# Patient Record
Sex: MALE | Race: BLACK OR AFRICAN AMERICAN | URBAN - METROPOLITAN AREA
[De-identification: names, ages, dates, MRNs, and addresses within clinical notes are randomized per-mention and may not be internally consistent; named-entity substitution may affect disease eponyms.]

---

## 2019-11-11 ENCOUNTER — HOSPITAL ENCOUNTER (OUTPATIENT)
Dept: PHYSICAL THERAPY | Age: 39
Discharge: HOME | End: 2019-11-11
Payer: COMMERCIAL

## 2019-11-11 PROCEDURE — 97162 PT EVAL MOD COMPLEX 30 MIN: CPT

## 2019-11-11 PROCEDURE — 97140 MANUAL THERAPY 1/> REGIONS: CPT

## 2019-11-11 NOTE — PROGRESS NOTES
[address] [facility] PHYSICAL THERAPY  [address] [facility] [address], [address] - Phone: [phone]  Fax: [phone] / [address] [facility]  Patient Name: Airam Gann : 1980   Medical   Diagnosis: Left shoulder pain [M25.512] Treatment Diagnosis: Left arm pain   Onset Date: 2019     Referral Source: Trenton Alas MD [facility]): 2019   Prior Hospitalization: See medical history Provider #: [id number]   Prior Level of Function: Independent   Comorbidities: Unremarkable   Medications: Verified on Patient Summary List   The Plan of Care and following information is based on the information from the initial evaluation.   ===========================================================================================  Assessment / key information:  Pt is a [place]year old male who presents to PT today with c/o left shoulder pain after sleeping on arm in 2019. Upon evaluation, signs and symptoms consistent with left RTC pathology. Pt has continued to perform his UE lifting routine without modifications despite reporting 7/10 left arm pain. Pt presents with abnormal posture, decreased left shoulder AROm, decreased shoulder girdle strength and decreased left t-spine mobility. PT noted decreased left parascapular musculature specifically middle trap and lower trap compared to contralateral shoulder girdle. Mild hiking noted with active elevation and decreased ER AROM and strength in 90/90 position. Pt would benefit from skilled PT to address functional deficits listed above and facilitate return to PLOF. Physical Therapy Evaluation - Shoulder     Posture:  Forward held head, rounded shoulders, mild kythphotic posture     Thoracic Spine: Limited rotation noted to the left compared to the right     C/S screen: WNL in all planes     Palpation: Pt TTP along posterior cuff region, specifically posterior corner and distal RTC region.      Scapulohumoral Control / Rhythm:  Static Position: WNL  Scapular position with weighted elevation: WNL  Able to eccentrically lower with good control?  Left: [x] Yes   [] No     Right: [x] Yes   [] No     ROM:                                             AROM/PROM    Left Right   Flexion WNL WNL   Extension WNL WNL   Scaption/ABD WNL WNL   ER @ 0 Degrees WNL WNL   ER @ 90 Degrees 67/80 90/WNL   IR @ HBB T10 T7   - Mild hiking noted with abduction     Strength:                                                                              L (1-5) R (1-5) Pain   Flexors 4/5 4+/5 [] Yes   [] No   Abductors 4/5 4+/5 [x] Yes   [] No   External Rotators 4/5 4+/5 [x] Yes   [] No   Internal Rotators 4/5 4+/5 [x] Yes   [] No   Extension 4+/5 4+/5 [] Yes   [] No   Serratus Anterior 4/5 4+/5 [] Yes   [] No   Subscap 4/5 4+/5 [x] Yes   [] No   Middle Trapezius 4/5 4+/5 [] Yes   [] No   Lower Trapezius 4-/5 4+/5 [] Yes   [] No      Accessory Motions: PT noted WNL accessory motion with normal laxity noted bilaterally     Special tests:    Neer's Test                  [] Pos   [x] Neg        Hawkin's Test              [x] Pos   [] Neg        Speed's Test               [] Pos   [x] Neg        Empty Can                  [x] Pos   [] Neg        Anterior Apprehension            [x] Pos   [] Neg        Posterior Apprehension          [x] Pos   [] Neg    ===========================================================================================  Eval Complexity: History: MEDIUM  Complexity : 1-2 comorbidities / personal factors will impact the outcome/ POC Exam:MEDIUM Complexity : 3 Standardized tests and measures addressing body structure, function, activity limitation and / or participation in recreation  Presentation: MEDIUM Complexity : Evolving with changing characteristics  Clinical Decision Making:MEDIUM Complexity : FOTO score of 26-74Overall Complexity:MEDIUM    FOTO score:    Problem List: pain affecting function, decrease ROM, decrease strength, edema affecting function, impaired gait/ balance, decrease ADL/ functional abilitiies, decrease activity tolerance, decrease flexibility/ joint mobility and decrease transfer abilities   Treatment Plan may include any combination of the following: Therapeutic exercise, Therapeutic activities, Neuromuscular re-education, Physical agent/modality, Gait/balance training, Manual therapy, Aquatic therapy, Patient education, Self Care training, Functional mobility training, Home safety training and Stair training, DRY needling  Patient / Family readiness to learn indicated by: asking questions, trying to perform skills and interest  Persons(s) to be included in education: patient (P)  Barriers to Learning/Limitations: None  Measures taken: NA   Patient Goal (s): Decrease pain   Patient self reported health status: excellent  Rehabilitation Potential: excellent   Short Term Goals: To be accomplished in  2  weeks:  1. Pt will be compliant with HEP for symptom management at home. 2. Pt will increase left shoulder 90/90 ER AROM to 90 deg pain free in order to complete all OH self hygiene tasks.  Long Term Goals: To be accomplished in  4  weeks:  1. Pt will be independent with HEP at D/C for self management. 2. Pt will complete UE recreational activity routine with no c/o left UE pain in order to return to prior activity level. 3. Pt will increase left lower trap strength to at least 4+/5 in order to improve scapular mechanics with OH elevation.    Frequency / Duration:   Patient to be seen  2  times per week for 4-6  weeks:  Patient / Caregiver education and instruction: self care, activity modification and exercises    Therapist Signature: Brandi Boucher PT Date: 53/00/5216   Certification Period: NA Time: 10:27 AM   ===========================================================================================  I certify that the above Physical Therapy Services are being furnished while the patient is under my care. I agree with the treatment plan and certify that this therapy is necessary. Physician Signature:        Date:       Time:     Please sign and return to [employer] or you may fax the signed copy to [phone]. Thank you.

## 2019-11-11 NOTE — PROGRESS NOTES
PHYSICAL THERAPY - DAILY TREATMENT NOTE    Patient Name: Janet Carrera        Date: 2019  : 1980   YES Patient  Verified  Visit #:   1     Insurance: Payor: Keisha Ortiz / Plan: Cristal Labrum / Product Type: PPO /      In time: 3:50 Out time: 4:40   Total Treatment Time: 50     Medicare Time Tracking (below)   Total Timed Codes (min):  NA 1:1 Treatment Time:  NA     TREATMENT AREA = Left shoulder pain [M25.512]    SUBJECTIVE    Pain Level (on 0 to 10 scale):  0  / 10   Medication Changes/New allergies or changes in medical history, any new surgeries or procedures? NO    If yes, update Summary List   Subjective Functional Status/Changes:  []  No changes reported   CC:Left shoulder pain    ADI/HPI: Pt is a RHD male who  states in late July he was sleeping on stomach with hands above head and woke with th \"worst shoulder pain. \" Pt reports the pain was constant specifically pain/clicking when turning left. Clicking is not present all the time. Pt reports his shoulder pain is getting better but he is losing mobility. Pt states he was lifting today with back squats and was unable to get left UE in position to hold bar. Pt denies any subluxations or dislocations. Since July, pt has laid on back with 10# in 90/90 position to work on ROM. Pt has continued his normal routine which is side raises, [employer] and bench press, and pec flys. Pt reports continuing his routine with 6-7/10 pain. Pt denies any numbness/tingling down UE's. Pt denies any changes in his workout routine or ADI to left shoulder region. Symptoms  Pain rating (0-10): Today:0/10   Best:0/10   Worst:7/10     Aggravating factors:ER in various positions of abduction.      Relieving factors:resting    Pain with functional activities:  [] Yes [x] No Reaching OH  [x] Yes [] No Tucking in shirt  [x] Yes [] No Lifting milk carton  [x] Yes [] No Pushing/pulling  [x] Yes [] No Reaching behind  [] Yes [x] No Checking mirrors  [] Yes [x] No Headaches    Occupational tasks: [employer]    Diagnostic Tests: None yet       OBJECTIVE  Physical Therapy Evaluation - Shoulder    Posture: Forward held head, rounded shoulders, mild kythphotic posture    Thoracic Spine: Limited rotation noted to the left compared to the right    C/S screen: WNL in all planes    Palpation: Pt TTP along posterior cuff region, specifically posterior corner and distal RTC region. Scapulohumoral Control / Rhythm:  Static Position: WNL  Scapular position with weighted elevation: WNL  Able to eccentrically lower with good control?  Left: [x] Yes   [] No     Right: [x] Yes   [] No    ROM:                                             AROM/PROM   Left Right   Flexion WNL WNL   Extension WNL WNL   Scaption/ABD WNL WNL   ER @ 0 Degrees WNL WNL   ER @ 90 Degrees 67/80 90/90   IR @ HBB T10 T7   - Mild hiking noted with abduction    Strength:                                                                             L (1-5) R (1-5) Pain   Flexors 4/5 4+/5 [] Yes   [] No   Abductors 4/5 4+/5 [x] Yes   [] No   External Rotators 4/5 4+/5 [x] Yes   [] No   Internal Rotators 4/5 4+/5 [x] Yes   [] No   Extension 4+/5 4+/5 [] Yes   [] No   Serratus Anterior 4/5 4+/5 [] Yes   [] No   Subscap 4/5 4+/5 [x] Yes   [] No   Middle Trapezius 4/5 4+/5 [] Yes   [] No   Lower Trapezius 4-/5 4+/5 [] Yes   [] No     Accessory Motions: PT noted WNL accessory motion with normal laxity noted bilaterally    Special tests:    Neer's Test  [] Pos   [x] Neg   Hawkin's Test  [x] Pos   [] Neg   Speed's Test  [] Pos   [x] Neg   Empty Can  [x] Pos   [] Neg   Anterior Apprehension [x] Pos   [] Neg   Posterior Apprehension [x] Pos   [] Neg      Other Tests / Comments:     Therapeutic Procedures:  Min Procedure Specifics + Rationale   n/a [x]  Patient Education (performed throughout session) [x] Review HEP    [] Progressed/Changed HEP based on:   [] proper performance and advancement of Therex/TA   [] reduction in pain level    [] increased functional capacity       [] change in directional preference   2 [x] Therapeutic Exercise   [x]  See Flowsheet   Rationale: increase ROM and increase strength to improve the patients ability to participate in ADL's    10 [x]  Manual Therapy         FRAM (F1/F2) and medium dynamic cupping along parascapular region. Rationale: decrease pain, increase ROM, increase tissue extensibility, decrease trigger points and increase postural awareness to attain functional use and participation with ADL's     Other Objective/Functional Measures:    See objective measurements above. Pt demonstrated and verbalized independence with his HEP. Post Treatment Pain Level (on 0 to 10) scale:   0  / 10     ASSESSMENT    Assessment/Changes in Function:See POC     Justification for Eval Code Complexity: Moderate  Patient History (low 0, mod 1-2, high 3-4): Moderate; left shoulder pain present since July 2019   Examination (low 1-2, mod 3+, high 4+): Moderate: decreased UE strength, decreased t-spine mobility, decreased shoulder AROM   Clinical Presentation (low: stable/uncomplicated; mod: evolving; high: unstable/unpredictable): Moderate  Clinical Decision Making (low , mod 26-74, high 1-25): Moderate: based on clinical evaluation.      [x]  See Plan of Care  []  See Progress Note/ Recertification  []  See Discharge Summary        Patient will continue to benefit from skilled PT services to modify and progress therapeutic interventions, address functional mobility deficits, address ROM deficits, address strength deficits, analyze and address soft tissue restrictions, analyze and cue movement patterns, analyze and modify body mechanics/ergonomics, assess and modify postural abnormalities, address imbalance/dizziness and instruct in home and community integration  to attain remaining goals   Progress toward goals / Updated goals:    See POC     PLAN    [x]  Upgrade activities as tolerated  [x]  Update interventions per flow sheet YES Continue plan of care   []  Discharge due to :    []  Other:      Therapist: Danisha Coleman DPT    Date: 11/11/2019 Time: 10:26 AM     Future Appointments   Date Time Provider Kamar Virgen   11/11/2019  4:00 PM Jay Sellers PT [facility]

## 2019-11-18 ENCOUNTER — HOSPITAL ENCOUNTER (OUTPATIENT)
Dept: PHYSICAL THERAPY | Age: 39
Discharge: HOME | End: 2019-11-18
Payer: COMMERCIAL

## 2019-11-18 PROCEDURE — 97110 THERAPEUTIC EXERCISES: CPT

## 2019-11-18 NOTE — PROGRESS NOTES
PHYSICAL THERAPY - DAILY TREATMENT NOTE    Patient Name: Alda Gibbs        Date: 2019  : 1980   YES Patient  Verified  Visit #:  2     Insurance: Payor: Roselia Sorenson / Plan: Daylin Ulrich / Product Type: PPO /      In time:  Out time: 830   Total Treatment Time: 35     Medicare/BCBS [place] Time Tracking (below)   Total Timed Codes (min):  35 1:1 Treatment Time:  35     TREATMENT AREA = Left shoulder pain [M25.512]  SUBJECTIVE    Pain Level (on 0 to 10 scale):  0  / 10   Medication Changes/New allergies or changes in medical history, any new surgeries or procedures?     NO    If yes, update Summary List   Subjective Functional Status/Changes:  []  No changes reported     Reports held shoulder workout at gym since last session  Denies pain at rest; pain with endrange extension and external rotatio         OBJECTIVE  Therapeutic Procedures:  Min Procedure Specifics + Rationale   n/a [x]  Patient Education (performed throughout session) [x] Review HEP    [] Progressed/Changed HEP based on:   [] proper performance and advancement of Therex/TA   [] reduction in pain level    [] increased functional capacity       [] change in directional preference   35(35) [x] Therapeutic Exercise   [x]  See Flowsheet   Rationale: increase ROM and increase strength to improve the patients ability to participate in ADL's      Other Objective/Functional Measures:    Initiated exercises for scapular strengthening and flexibility of anterior chest wall and shoulder  Reviewed HEP; added prone strengthening exercises and open book stretch  Trialed doorway stretch; demonstrates decreased shoulder extension at 90 degrees abduction and reports pain; modified with elevated shoulder height      Post Treatment Pain Level (on 0 to 10) scale:  0 / 10     ASSESSMENT    Assessment/Changes in Function:     Increased left shoulder scapular strength and mechanics needed to aid in recreational gym program     []  See Progress Note/Recertification   Patient will continue to benefit from skilled PT services to analyze, cue, progress, modify, demonstrate, instruct, and address, movement patterns, therapeutic interventions, postural abnormalities, soft tissue restrictions, ROM, strength, functional mobility, body mechanics/ergonomics, and home and community integration to attain remaining goals. Progress toward goals / Updated goals:  Progressing; initiated exercises per flowsheet to address goals  · Short Term Goals: To be accomplished in  2  weeks:  1. Pt will be compliant with HEP for symptom management at home. 2. Pt will increase left shoulder 90/90 ER AROM to 90 deg pain free in order to complete all OH self hygiene tasks. · Long Term Goals: To be accomplished in  4  weeks:  1. Pt will be independent with HEP at D/C for self management. 2. Pt will complete UE recreational activity routine with no c/o left UE pain in order to return to prior activity level. 3. Pt will increase left lower trap strength to at least 4+/5 in order to improve scapular mechanics with OH elevation.       PLAN    [x]  Upgrade activities as tolerated YES Continue plan of care   []  Discharge due to :    []  Other:      Therapist: Ned Sarabia PT, DPT    Date: 11/18/2019 Time: 8:44 AM     Future Appointments   Date Time Provider Kamar Virgen   11/25/2019  8:00 AM Travis Hickman PT [facility]

## 2019-11-25 ENCOUNTER — HOSPITAL ENCOUNTER (OUTPATIENT)
Dept: PHYSICAL THERAPY | Age: 39
Discharge: HOME | End: 2019-11-25
Payer: COMMERCIAL

## 2019-11-25 PROCEDURE — 97110 THERAPEUTIC EXERCISES: CPT

## 2019-11-25 NOTE — PROGRESS NOTES
PHYSICAL THERAPY - DAILY TREATMENT NOTE    Patient Name: Forest Betts        Date: 2019  : 1980   YES Patient  Verified  Visit #:   3     Insurance: Payor: Lei Steen / Plan: Iqra Toure / Product Type: PPO /      In time: 8:00 Out time: 8:32   Total Treatment Time: 32     Medicare/BCBS [place] Time Tracking (below)   Total Timed Codes (min):  32 1:1 Treatment Time:  32     TREATMENT AREA =  Left shoulder pain [M25.512]    SUBJECTIVE    Pain Level (on 0 to 10 scale):  0  / 10   Medication Changes/New allergies or changes in medical history, any new surgeries or procedures?    no    If yes, update Summary List   Subjective Functional Status/Changes:  []  No changes reported     \"My shoulder still hurts when I do certain work, but it is definitely getting better. \"   Patient reports he wants to continue exercises independently \"for a while\" and see how he does. OBJECTIVE    32 min Therapeutic Exercise:  [x]  See flow sheet   Rationale:      increase ROM and increase strength to improve the patients ability to perform ADL's and functional mobility with decreased pain. min Patient Education:  YES  Reviewed HEP   []  Progressed/Changed HEP based on:   Cont HEP; added supine ER stretch     Other Objective/Functional Measures:    AROM shoulder ER: left: 85 deg, right: 110 deg  Hawkin's test: negative  Empty can test: negative    Added supine ER stretches per flowsheet. Post Treatment Pain Level (on 0 to 10) scale:   0  / 10     ASSESSMENT    Assessment/Changes in Function:     Patient demonstrates good form with most exercises. Required verbal and tactile cues for correct form with LT wall slides.      []  See Progress Note/Recertification   Patient will continue to benefit from skilled PT services to modify and progress therapeutic interventions, address functional mobility deficits, address ROM deficits, address strength deficits, analyze and address soft tissue restrictions, analyze and cue movement patterns, analyze and modify body mechanics/ergonomics, assess and modify postural abnormalities and instruct in home and community integration to attain remaining goals. Progress toward goals / Updated goals: · Short Term Goals: To be accomplished in  2  weeks:  1. Pt will be compliant with HEP for symptom management at home. Reports decreasing pain. 2. Pt will increase left shoulder 90/90 ER AROM to 90 deg pain free in order to complete all OH self hygiene tasks. Left shoulder 90/90 ER AROM to 85 deg    Long Term Goals: To be accomplished in  4  weeks:  1. Pt will be independent with HEP at D/C for self management. Reports compliance with HEP  2. Pt will complete UE recreational activity routine with no c/o left UE pain in order to return to prior activity level.   3. Pt will increase left lower trap strength to at least 4+/5 in order to improve scapular mechanics with OH elevation.      PLAN    [x]  Upgrade activities as tolerated YES Continue plan of care   []  Discharge due to :    []  Other:      Therapist: Tin Sofia PT    Date: 11/25/2019 Time: 7:45 AM     Future Appointments   Date Time Provider Kamar Virgen   11/25/2019  8:00 AM Adan Arango, PT [facility]

## 2020-01-08 NOTE — PROGRESS NOTES
[facility] PHYSICAL THERAPY  [address] [place] [address] Natty Roman, [address] - Phone: [phone]  Fax: [phone] SUMMARY  Patient Name: Farzad Watkins : 1980   Treatment/Medical Diagnosis: Left shoulder pain [M25.512]   Referral Source: Teena Kim MD     Date of Initial Visit: 2019 Attended Visits: 3 Missed Visits: 0     SUMMARY OF TREATMENT  Patient received 3 treatment of ROM and strengthening exercises, patient education and home exercise program for left shoulder pain. CURRENT STATUS  He was last seen on 2019. During that visit, patient reported no pain in left shoulder and requested to continue exercises independently \"for a while to see how he does. \"  He was issued a home exercise program.  Attempts to contact him for follow up assessment have been unsuccessful. Therefore, we will discharge him at this time. At time of D/C:   AROM shoulder ER: left: 85 deg, right: 110 deg  Hawkin's test: negative  Empty can test: negative    Goal/Measure of Progress Goal Met? 1. Pt will be independent with HEP at D/C for self management. Status at last Eval: NA Current Status: independent with HEP yes   2. Pt will complete UE recreational activity routine with no c/o left UE pain in order to return to prior activity level. Status at last Eval: Left shoulder pain  Current Status: Left shoulder pain decreasing progressing   3. Pt will increase left lower trap strength to at least 4+/5 in order to improve scapular mechanics with OH elevation. Status at last Eval: 4-/5 Current Status: NT n/a     RECOMMENDATIONS  Discontinue therapy. Progressing towards or have reached established goals. If you have any questions/comments please contact us directly at [phone]. Thank you for allowing us to assist in the care of your patient.     Therapist Signature: Nolan Morales PT Date: 2020     Time: 10:46 AM